# Patient Record
Sex: FEMALE | Race: WHITE | NOT HISPANIC OR LATINO | URBAN - METROPOLITAN AREA
[De-identification: names, ages, dates, MRNs, and addresses within clinical notes are randomized per-mention and may not be internally consistent; named-entity substitution may affect disease eponyms.]

---

## 2020-01-29 ENCOUNTER — OFFICE VISIT (OUTPATIENT)
Dept: URBAN - METROPOLITAN AREA CLINIC 76 | Facility: CLINIC | Age: 75
End: 2020-01-29
Payer: MEDICARE

## 2020-01-29 DIAGNOSIS — H43.813 VITREOUS DEGENERATION, BILATERAL: ICD-10-CM

## 2020-01-29 DIAGNOSIS — H25.11 AGE-RELATED NUCLEAR CATARACT, RIGHT EYE: Primary | ICD-10-CM

## 2020-01-29 DIAGNOSIS — Z96.1 PRESENCE OF INTRAOCULAR LENS: ICD-10-CM

## 2020-01-29 DIAGNOSIS — H52.4 PRESBYOPIA: ICD-10-CM

## 2020-01-29 PROCEDURE — 99215 OFFICE O/P EST HI 40 MIN: CPT | Performed by: OPHTHALMOLOGY

## 2020-01-29 RX ORDER — OFLOXACIN 3 MG/ML
0.3 % SOLUTION/ DROPS OPHTHALMIC
Qty: 5 | Refills: 1 | Status: ACTIVE
Start: 2020-01-29

## 2020-01-29 RX ORDER — DUREZOL 0.5 MG/ML
0.05 % EMULSION OPHTHALMIC
Qty: 5 | Refills: 1 | Status: ACTIVE
Start: 2020-01-29

## 2020-01-29 ASSESSMENT — INTRAOCULAR PRESSURE
OS: 16
OD: 16

## 2020-01-29 ASSESSMENT — VISUAL ACUITY
OD: 20/40
OS: 20/20

## 2020-01-29 ASSESSMENT — KERATOMETRY
OS: 42.50
OD: 42.63

## 2020-01-29 NOTE — IMPRESSION/PLAN
Impression: Age-related nuclear cataract, right eye: H25.11. OD. Visually significant Plan: Cataracts account for the patient's complaints. Discussed all risks, benefits, procedures and recovery. Patient understands changing glasses will not improve vision. Patient desires to have surgery, recommend CE IOL OD. Discussed iol options, recommend STANDARD (patient would like to proceed w/ STANDARD) IOL . TARGET: NEAR (-2.25)   RL2 Discussed with Patient the need for gls distance and near after Cataract Surgery. Patient understands. Discussed monova option with patient. Per patient she does not want to wear glasses for reading. Will rely on Dr Silvana Phoenix for Primary and [address] [facility].

## 2020-02-03 ENCOUNTER — PRE-OPERATIVE VISIT (OUTPATIENT)
Dept: URBAN - METROPOLITAN AREA CLINIC 76 | Facility: CLINIC | Age: 75
End: 2020-02-03
Payer: MEDICARE

## 2020-02-03 DIAGNOSIS — H25.13 AGE-RELATED NUCLEAR CATARACT, BILATERAL: Primary | ICD-10-CM

## 2020-02-03 PROCEDURE — 92136 OPHTHALMIC BIOMETRY: CPT | Performed by: OPHTHALMOLOGY

## 2020-02-03 ASSESSMENT — PACHYMETRY
OS: 4.87
OD: 24.45
OD: 3.20
OS: 24.36

## 2020-02-12 ENCOUNTER — SURGERY (OUTPATIENT)
Dept: URBAN - METROPOLITAN AREA SURGERY 47 | Facility: SURGERY | Age: 75
End: 2020-02-12
Payer: MEDICARE

## 2023-02-13 ENCOUNTER — SURGERY (OUTPATIENT)
Dept: URBAN - METROPOLITAN AREA SURGERY 45 | Facility: SURGERY | Age: 78
End: 2023-02-13
Payer: MEDICARE

## 2023-12-13 ENCOUNTER — SURGERY (OUTPATIENT)
Dept: URBAN - METROPOLITAN AREA SURGERY 44 | Facility: SURGERY | Age: 78
End: 2023-12-13
Payer: MEDICARE

## 2023-12-13 PROCEDURE — 66821 AFTER CATARACT LASER SURGERY: CPT | Performed by: OPHTHALMOLOGY
